# Patient Record
Sex: FEMALE | Race: WHITE
[De-identification: names, ages, dates, MRNs, and addresses within clinical notes are randomized per-mention and may not be internally consistent; named-entity substitution may affect disease eponyms.]

---

## 2024-07-11 PROBLEM — Z00.00 ENCOUNTER FOR PREVENTIVE HEALTH EXAMINATION: Status: ACTIVE | Noted: 2024-07-11

## 2024-07-29 ENCOUNTER — NON-APPOINTMENT (OUTPATIENT)
Age: 59
End: 2024-07-29

## 2024-07-30 ENCOUNTER — APPOINTMENT (OUTPATIENT)
Dept: OBGYN | Facility: CLINIC | Age: 59
End: 2024-07-30
Payer: COMMERCIAL

## 2024-07-30 VITALS
DIASTOLIC BLOOD PRESSURE: 86 MMHG | BODY MASS INDEX: 25.69 KG/M2 | HEART RATE: 74 BPM | SYSTOLIC BLOOD PRESSURE: 135 MMHG | OXYGEN SATURATION: 99 % | WEIGHT: 145 LBS | HEIGHT: 63 IN

## 2024-07-30 DIAGNOSIS — Z78.9 OTHER SPECIFIED HEALTH STATUS: ICD-10-CM

## 2024-07-30 DIAGNOSIS — R93.89 ABNORMAL FINDINGS ON DIAGNOSTIC IMAGING OF OTHER SPECIFIED BODY STRUCTURES: ICD-10-CM

## 2024-07-30 DIAGNOSIS — N95.0 POSTMENOPAUSAL BLEEDING: ICD-10-CM

## 2024-07-30 PROCEDURE — 99204 OFFICE O/P NEW MOD 45 MIN: CPT

## 2024-07-30 RX ORDER — PROGESTERONE 200 MG/1
CAPSULE ORAL
Refills: 0 | Status: ACTIVE | COMMUNITY

## 2024-07-30 RX ORDER — ESTRADIOL 0.04 MG/D
0.04 PATCH, EXTENDED RELEASE TRANSDERMAL
Refills: 0 | Status: ACTIVE | COMMUNITY

## 2024-07-30 NOTE — DISCUSSION/SUMMARY
[FreeTextEntry1] : I sat down with the patient to discuss the imaging findings & her symptoms which warrant surgical intervention. I explained that the thickened endometrium can be caused by several tissue types. Such as hyperplasia, normal endometrium, polyps, myoma and cancer of the lining of the uterus.  Discussion about management options for thickened endometrium including continued observation and surgery.  Hysteroscopy with removal of endometrial mass. General recommendations are to sample lining if greater than 4 mm if women has bleeding after menopause or 11 mm if no bleeding.   The indications, risks, benefits and alternatives were discussed. but not limited to bleeding, infection, uterine perforation with injury to surrounding organs was discussed at length.  Chance of occult injury and need for future surgery.  Barbara Salinas expressed an understanding of the treatment rationale and her questions were answered to her apparent satisfaction.  She was given written information about postoperative care and diagrams of the pelvic anatomy.

## 2024-07-30 NOTE — HISTORY OF PRESENT ILLNESS
[FreeTextEntry1] : 57 yo on HRT with bleeding, ultrasound showed thickened endometrium. Biopsy polyp and proliferative endometrium. Here to discuss Hysteroscopy

## 2024-07-30 NOTE — CONSULT LETTER
[Dear  ___] : Dear ~OCTAVIO, [Consult Letter:] : I had the pleasure of evaluating your patient, [unfilled]. [( Thank you for referring [unfilled] for consultation for _____ )] : Thank you for referring [unfilled] for consultation for [unfilled] [Please see my note below.] : Please see my note below. [Consult Closing:] : Thank you very much for allowing me to participate in the care of this patient.  If you have any questions, please do not hesitate to contact me. [Sincerely,] : Sincerely, [FreeTextEntry2] : Jia Garcia MD  [address] [place] [address]  [FreeTextEntry3] : Reece Sofia MD, FACOG, FACS Minimally Invasive Gynecologic Surgery [address] [address] Tel: [phone] Fax: [phone]

## 2024-09-12 NOTE — ASU PATIENT PROFILE, ADULT - NSICDXPASTSURGICALHX_GEN_ALL_CORE_FT
PAST SURGICAL HISTORY:  BCC (basal cell carcinoma) nose    S/P      Tear of left glenoid labrum

## 2024-09-12 NOTE — ASU PATIENT PROFILE, ADULT - VISION (WITH CORRECTIVE LENSES IF THE PATIENT USUALLY WEARS THEM):
Normal vision: sees adequately in most situations; can see medication labels, newsprint glasses PRN/Partially impaired: cannot see medication labels or newsprint, but can see obstacles in path, and the surrounding layout; can count fingers at arm's length

## 2024-09-12 NOTE — ASU PATIENT PROFILE, ADULT - NS PREOP UNDERSTANDS INFO
bring photo id , insurance, credit cards. nothing to eat from midnight, clears till 8:15 am dos. no smoking/alcohol/drugs/jewelry/valuables . wear loose comfort clothes. must have an escort. no candy/chewing gum /no milk. address and phone # provided/yes

## 2024-09-13 ENCOUNTER — APPOINTMENT (OUTPATIENT)
Dept: OBGYN | Facility: AMBULATORY SURGERY CENTER | Age: 59
End: 2024-09-13

## 2024-09-13 ENCOUNTER — TRANSCRIPTION ENCOUNTER (OUTPATIENT)
Age: 59
End: 2024-09-13

## 2024-09-13 ENCOUNTER — OUTPATIENT (OUTPATIENT)
Dept: OUTPATIENT SERVICES | Facility: HOSPITAL | Age: 59
LOS: 1 days | Discharge: HOME | End: 2024-09-13
Payer: COMMERCIAL

## 2024-09-13 ENCOUNTER — RESULT REVIEW (OUTPATIENT)
Age: 59
End: 2024-09-13

## 2024-09-13 VITALS
OXYGEN SATURATION: 98 % | SYSTOLIC BLOOD PRESSURE: 130 MMHG | HEART RATE: 66 BPM | RESPIRATION RATE: 14 BRPM | DIASTOLIC BLOOD PRESSURE: 67 MMHG

## 2024-09-13 VITALS — WEIGHT: 145.06 LBS | HEIGHT: 63 IN

## 2024-09-13 DIAGNOSIS — S43.432A SUPERIOR GLENOID LABRUM LESION OF LEFT SHOULDER, INITIAL ENCOUNTER: Chronic | ICD-10-CM

## 2024-09-13 DIAGNOSIS — C44.91 BASAL CELL CARCINOMA OF SKIN, UNSPECIFIED: Chronic | ICD-10-CM

## 2024-09-13 DIAGNOSIS — Z98.891 HISTORY OF UTERINE SCAR FROM PREVIOUS SURGERY: Chronic | ICD-10-CM

## 2024-09-13 PROCEDURE — 88305 TISSUE EXAM BY PATHOLOGIST: CPT | Mod: 26

## 2024-09-13 PROCEDURE — 58558 HYSTEROSCOPY BIOPSY: CPT

## 2024-09-13 DEVICE — AVETA SMOL RESECTING DEVICE 2.9MM: Type: IMPLANTABLE DEVICE | Status: FUNCTIONAL

## 2024-09-13 RX ORDER — PROGESTERONE, MICRONIZED 100 %
2 POWDER (GRAM) MISCELLANEOUS
Refills: 0 | DISCHARGE

## 2024-09-13 RX ORDER — ACETAMINOPHEN 325 MG/1
1000 TABLET ORAL ONCE
Refills: 0 | Status: COMPLETED | OUTPATIENT
Start: 2024-09-13 | End: 2024-09-13

## 2024-09-13 RX ORDER — KETOROLAC TROMETHAMINE 30 MG/ML
30 INJECTION, SOLUTION INTRAMUSCULAR ONCE
Refills: 0 | Status: DISCONTINUED | OUTPATIENT
Start: 2024-09-13 | End: 2024-09-13

## 2024-09-13 RX ORDER — ESTRADIOL 0.1 MG/D
1 PATCH TRANSDERMAL
Refills: 0 | DISCHARGE

## 2024-09-13 RX ADMIN — Medication 100 MILLILITER(S): at 09:56

## 2024-09-13 RX ADMIN — KETOROLAC TROMETHAMINE 30 MILLIGRAM(S): 30 INJECTION, SOLUTION INTRAMUSCULAR at 09:55

## 2024-09-13 RX ADMIN — KETOROLAC TROMETHAMINE 30 MILLIGRAM(S): 30 INJECTION, SOLUTION INTRAMUSCULAR at 10:10

## 2024-09-13 RX ADMIN — ACETAMINOPHEN 1000 MILLIGRAM(S): 325 TABLET ORAL at 08:30

## 2024-09-13 NOTE — ASU DISCHARGE PLAN (ADULT/PEDIATRIC) - CARE PROVIDER_API CALL
Reece Sofia  Obstetrics and Gynecology  [address] [place] [id number]  Phone: [phone]  Fax: [phone]  Established Patient  Follow Up Time: 2 weeks

## 2024-09-13 NOTE — BRIEF OPERATIVE NOTE - OPERATION/FINDINGS
HSC revealed overall normal appearing intra uterine cavity with polyp located near left tubal ostia. B/l tubal ostia visualized- right ostia noted to be dilated. Two small myomas noted on anterior and posterior aspects of intrauterine cavity, resected.    [facility] fluid deficit 60mL NS

## 2024-09-13 NOTE — BRIEF OPERATIVE NOTE - NSICDXBRIEFPROCEDURE_GEN_ALL_CORE_FT
PROCEDURES:  Hysteroscopic polypectomy of uterus 13-Sep-2024 08:38:52  Michelle Pereyra  D&C (dilatation and curettage, scraping of uterus) 13-Sep-2024 08:39:15  Michelle Pereyra

## 2024-09-13 NOTE — ASU DISCHARGE PLAN (ADULT/PEDIATRIC) - NS MD DC FALL RISK RISK
For information on Fall & Injury Prevention, visit: [email or web address] OR  [email or web address] OR  https://www.cdc.gov/steadi/patient.html

## 2024-09-13 NOTE — PRE-ANESTHESIA EVALUATION ADULT - NSANTHOSAYNRD_GEN_A_CORE
No. PARAM screening performed.  STOP BANG Legend: 0-2 = LOW Risk; 3-4 = INTERMEDIATE Risk; 5-8 = HIGH Risk

## 2024-09-17 ENCOUNTER — NON-APPOINTMENT (OUTPATIENT)
Age: 59
End: 2024-09-17

## 2024-09-17 LAB — SURGICAL PATHOLOGY STUDY: SIGNIFICANT CHANGE UP

## 2024-09-24 ENCOUNTER — APPOINTMENT (OUTPATIENT)
Dept: OBGYN | Facility: CLINIC | Age: 59
End: 2024-09-24

## 2024-09-24 VITALS
SYSTOLIC BLOOD PRESSURE: 134 MMHG | HEIGHT: 63 IN | DIASTOLIC BLOOD PRESSURE: 82 MMHG | BODY MASS INDEX: 26.05 KG/M2 | OXYGEN SATURATION: 98 % | WEIGHT: 147 LBS | HEART RATE: 65 BPM

## 2024-09-24 PROCEDURE — 99213 OFFICE O/P EST LOW 20 MIN: CPT

## 2024-09-24 NOTE — HISTORY OF PRESENT ILLNESS
[FreeTextEntry1] : 58 year old presents for postoperative visit s/p Diagnostic hysteroscopy and polypectomy with Aveta 09/13/24.  She reports regular voiding and bowel movements, denies fevers/chills/dysuria.  Not taking pain medication.

## 2024-09-24 NOTE — DISCUSSION/SUMMARY
[FreeTextEntry1] : 58 year old s/p Diagnostic hysteroscopy and polypectomy with Aveta presents for postoperative visit doing well  -given copy of pathology and operative report  -continue pelvic rest with modified activity  -follow up PRN

## (undated) DEVICE — MYOSURE SCOPE SEAL

## (undated) DEVICE — FLUENT FMS PROCEDURE KIT

## (undated) DEVICE — PREP BETADINE KIT

## (undated) DEVICE — GLV 7 PROTEXIS (WHITE)